# Patient Record
Sex: FEMALE
[De-identification: names, ages, dates, MRNs, and addresses within clinical notes are randomized per-mention and may not be internally consistent; named-entity substitution may affect disease eponyms.]

---

## 2017-12-05 ENCOUNTER — HOSPITAL ENCOUNTER (OUTPATIENT)
Dept: HOSPITAL 14 - H.OPSURG | Age: 67
Discharge: HOME | End: 2017-12-05
Attending: SPECIALIST
Payer: MEDICARE

## 2017-12-05 VITALS — OXYGEN SATURATION: 98 % | HEART RATE: 94 BPM | SYSTOLIC BLOOD PRESSURE: 150 MMHG | DIASTOLIC BLOOD PRESSURE: 80 MMHG

## 2017-12-05 VITALS — TEMPERATURE: 97.5 F

## 2017-12-05 VITALS — RESPIRATION RATE: 20 BRPM

## 2017-12-05 VITALS — BODY MASS INDEX: 26.7 KG/M2

## 2017-12-05 DIAGNOSIS — E78.5: ICD-10-CM

## 2017-12-05 DIAGNOSIS — I25.10: ICD-10-CM

## 2017-12-05 DIAGNOSIS — M65.811: ICD-10-CM

## 2017-12-05 DIAGNOSIS — M75.51: ICD-10-CM

## 2017-12-05 DIAGNOSIS — E11.9: ICD-10-CM

## 2017-12-05 DIAGNOSIS — M75.41: Primary | ICD-10-CM

## 2017-12-05 PROCEDURE — 82948 REAGENT STRIP/BLOOD GLUCOSE: CPT

## 2017-12-05 PROCEDURE — 93005 ELECTROCARDIOGRAM TRACING: CPT

## 2017-12-05 PROCEDURE — 29827 SHO ARTHRS SRG RT8TR CUF RPR: CPT

## 2017-12-05 PROCEDURE — 29826 SHO ARTHRS SRG DECOMPRESSION: CPT

## 2017-12-05 NOTE — PCM.ANESB1
Interscalene Block





- Brachial Plexus


Date of Procedure: 12/05/17


Anesthesiologist: Ryan


Pre-Procedure Diagnosis: Right rotator cuff tear


Post-Procedure Diagnosis: Same


Procedure Performed: Interscalene Block of Brachial Plexus Right





- Procedure


Interscalene Block of Brachial Plexus: 


This procedure was explained to the patient that it is for post-operative pain 

management. Consent was obtained after a thorough discussion with the patient 

regarding the benefits and possible complications of local anesthetic block of 

the Brachial Plexus at the Interscalene area. The patient was brought to the 

Operating Room and standard monitors were applied. Time out was held with the 

circulating nurse to confirm the correct surgery and appropriate block. After 

applying Oxygen by nasal cannula and administering IV Sedation, the patient's 

head was gently rotated away from the ___right___operative shoulder and the 

anterior scalene groove was carefully palpated. The ultrasound transducer was 

then applied to the skin in the transverse plane and the brachial plexus was 

visualized lateral to the carotid artery and in between the anterior and middle 

scalene muscles. After identification,the anterior lateral portion of the neck 

was prepped with Betadine solution three times and Lidocaine 1% was injected 

subcutaneously for topical analgesia.





At this point, a # 22 gauge Stimuplex 2 inches insulated needle was inserted 

into the interscalene groove and directed in a caudal and midline direction. 

The needle was inserted lateral to the ultrasound transducer in-plane towards 

the brachial plexus in a lateral-to-medial direction. Needle advancement was 

performed carefully under direct ultrasound visualization. Nerve stimulator was 

used and twitched of the affected extremity including the hand brachialis 

muscles, biceps and the deltoid was obtained at a current of __0.4___MA. After 

repeated negative aspiration,___20__cc of__.5%___,__marcaine________________

were injected. Under ultrasound guidance the local anesthetics were observed 

surrounding the roots of the brachial plexus. The needle was removed intact and 

sterile dressing was applied. The patient had stable vital signs, was conscious 

and in no apparent distress.





The patient tolerated the interscalene block of the bracheal plexus well with 

stable vital signs and was prepared for subsequent surgery.

## 2017-12-05 NOTE — PCM.SURG1
Surgeon's Initial Post Op Note





- Surgeon's Notes


Surgeon: Travis Ramos MD 


Assistant: Robyn White PA-C


Type of Anesthesia: General Endo, Block Regional


Pre-Operative Diagnosis: Right shoulder complete RTC tear


Operative Findings: see op report


Post-Operative Diagnosis: Same as pre-op dx


Operation Performed: Right shoulder arthroscopy, double row rotator cuff repair

, subacromial decompression, debriedement


Specimen/Specimens Removed: none


Estimated Blood Loss: EBL {In ML}: 5


Date of Surgery/Procedure: 12/05/17


Time of Surgery/Procedure: 09:00

## 2017-12-05 NOTE — OP
PROCEDURE DATE:  12/05/2017





PREOPERATIVE DIAGNOSES:

1.  Right shoulder full-thickness rotator cuff tear.

2.  Synovitis.

3.  Impingement.



POSTOPERATIVE DIAGNOSES:

1.  Right shoulder massive full-thickness supraspinatus tear.

2.  Extensive synovitis.

3.  Anterior capsular adhesions.

4.  Bursitis.

5.  Impingement.



PROCEDURES:

1.  Right shoulder arthroscopy, double row rotator cuff repair.

2.  Anterior capsular lysis of adhesions.

3.  Subacromial decompression with acromioplasty.

4.  Double row rotator cuff repair.



ANESTHESIA TYPE:  General with interscalene block.



ESTIMATED BLOOD LOSS:  10 mL.



SPECIMENS:  None.



COMPLICATIONS:  None.



ATTENDING SURGEON:  Travis Ramos MD



ASSISTANT:  Robyn White PA-C.



CLOSURE:  Primary.



FLUIDS:  See anesthesia sheet.



ANTIBIOTICS:  See anesthesia sheet.



INDICATIONS:  After failing a course of nonoperative therapy, the patient

elected to undergo the above procedures.  In the office the risks and

possible complications of the shoulder arthroscopy were discussed in detail

with the patient.  These risks include, but are not limited to, continued

pain, lack of motion, infection, vascular injury, and nerve injury

including axillary nerve dysfunction, reflex sympathetic dystrophy,

compartment syndrome, limb loss, and death.



The patient expressed an understanding of the risks and possible benefits

of the procedure, and was also made aware of the alternatives to surgery. 

An informed consent was obtained, and was checked immediately preop.



Procedure 1:  The patient was correctly identified in the holding area and

the right shoulder was marked with the surgeon's initials.  The patient was

transported to the operating room and placed in the supine position and

general anesthesia and regional interscalene block was used, exam under

anesthesia was obtained.  A preoperative orthopedic examination revealed a

passive range of motion of 160 degrees of forward elevation, 40 degrees of

external rotation, and 150 degrees of abduction.  Stability examination

revealed, no instability.



Procedure 2:  The patient was then placed in a beach chair position

utilizing the beach chair positioning device.  The patient's head was

stabilized and the indicated upper extremity was prepped and draped in the

standard surgical fashion.  The anatomic structures were outlined with a

skin marker, and 1% lidocaine with epinephrine was injected into the

posterior, anterior, and lateral portal areas.  A #21-gauge spinal needle

was placed in the glenohumeral joint from the posterior portal and 10 mL of

sterile saline was injected into the glenohumeral joint.  Return of fluid

indicated correct needle placement into the joint.  The needle was then

withdrawn and a #11 blade was used to make a 1-cm incision at the posterior

portal site.  Next, the arthroscopic blunt trocar was inserted into the

glenohumeral joint.  A #21-gauge spinal needle was placed through the

anterior rotator interval, and the anterior portal was made with a #11

blade after the spinal needle was withdrawn.  A 7-mm cannula was then

inserted after the skin incision was made and the arthroscopic probe was

then used to examine the internal structures of the glenohumeral joint. 

With the shoulder in abducted and externally rotated position, the

articular surface of the rotator cuff was visualized.  The arthroscope and

probe were then switched from posterior to anterior.  The posterior labrum,

posterior capsule, and biceps anchor reflection was then inspected with the

arthroscope in the anterior portal position.



Examination of the glenohumeral joint revealed:

1.  Synovitis.

2.  Full-thickness retracted supraspinatus and infraspinatus tear.

3.  Anterior capsular adhesions.



The radiofrequency device was introduced through the anterior portal and a

radiofrequency anterior capsular rotator interval lysis of adhesions was

performed.  The anterior capsule was released to optimize range of motion. 

The radiofrequency device was used to provide hemostasis during this

procedure.  After the lysis of adhesions, passive range of motion measured

170 degrees of forward elevation, 60 degrees of external rotation, and 150

degrees of abduction.



Excessive glenohumeral synovitis was cleared with a 4.0 mm full radius

shaver.  The hypertrophic, erythematous synovium was resected.  Hemostasis

was maintained with the radiofrequency device.



At this point, the arthroscope was withdrawn from the glenohumeral joint

and subacromial space was then entered using a blunt trocar.  Gentle

resistance sweeping against the coracoacromial ligament confirmed proper

placement of the sheath and the arthroscope was inserted.  A 1-cm incision

was made at the inferolateral acromial area to create the lateral portal.



Examination of the subacromial space revealed:

1.  Full thickness retracted supraspinatus tear.

2.  Bursitis.

3.  Impingement.



Visualization of the subacromial space was difficult due to excessive

bursitis.  A bursectomy was performed using a combination of radiofrequency

device as well as a 4.0-mm full radius motorized shaver.



The soft tissue on the undersurface of the acromion was debrided utilizing

the 4.0-mm full radius shaver and the radiofrequency device was used for

hemostasis.  At this point, the coracoacromial ligament was released with

the radiofrequency device and the acromial branch of the thoracoacromial

artery was coagulated with the same instrument.  Subacromial decompression

was performed with a 4.0-mm conical elizabeth using both the medial portal and

the "cutting-block" precision acromioplasty technique from the posterior

portal.  The undersurface of the acromion was resected to a flat, smooth

surface to allow unrestricted excursion of the rotator cuff.



After adequate subacromial decompression, attention was then turned to the

rotator cuff tear, which was easily visualized after adequate bursectomy

had been performed.  An auxiliary lateral portal was placed 2 cm posterior

to the original lateral portal, after a correct "dead-man's angle" was

determined using a transdeltoid 21 gauge spinal needle.  Arthroscopic soft

tissue releases were performed using an elevator at the coracohumeral

ligament insertion and superior glenoid to free up the rotator cuff to

provide adequate excursion to support a repair to the greater tuberosity. 

Next, the greater tuberosity was gently debrided with a combination of the

4.0 mm straight shaver and radiofrequency device, and the bone was denuded

to a bleeding surface using the 4.0-mm elizabeth.  The lateral margin of the

rotator cuff tear was debrided to a smooth and stable tendon surface using

the 4.0-mm shaver.  Two 4.5-mm Arthrex corkscrew suture anchors were placed

with the proper "dead-man's angle" into the greater tuberosity, and a

mattress suture from each anchor was passed through supraspinatus 10 mm

medial to the torn edge.  These anchors formed the medial row of the double

row repair.  The arm was abducted to 70 degrees, and the leading edge of

the cuff was drawn to its proper insertion on the greater tuberosity.  The

#2 FiberWire mattress sutures were tied with standard arthroscopic knot

tying techniques - Bakari knots and half hitches using a knot pusher.  The

remaining sutures were secured to the tuberosity with two 4.5-mm PushLock

absorbable anchors, which were placed with standard technique into the

lateral aspect of the greater tuberosity approximately 1 cm lateral to the

medial row anchors.  One suture strand from each knot was crossed to the

diagonal PushLock anchor along with the suture strand from the

corresponding anchor directly medial.  This linking of the medial and

lateral row formed a "suture bridge" rotator cuff repair.  The ends of the

remaining sutures were then cut.  The shoulder was put through a passive

ROM, and the rotator cuff repair was noted to be stable through a ROM of

130/50.  No prominence of the suture knots or of rotator cuff tissue was

noted to impinge during abduction and internal rotation.



The subacromial space was then irrigated with sterile saline, and closure

was instituted with sutures.  A dressing was placed consisting of Xeroform,

4x4's, ABD pads, and tape.  The patient was placed in a sling with an ABD

pad in the axilla.



The patient was then placed in a supine position and extubated without

incident.  The patient was transferred to the recovery room in stable

condition, having tolerated the procedure well.



Postoperatively, the patient will be maintained in an abduction sling, also

provided with my rehab protocol, defining the restriction and sling use for

6 weeks.



During this procedure, I was assisted by Robyn White PA-C, who assisted

in positioning the patient on the operating room table as well as

transferring the patient from the operating room table to the recovery room

stretcher.  In addition, Robyn White PA-C, assisted me during the

actual operative procedure by positioning the patient's extremity to allow

for easier arthroscopic access to all areas of the joint.  The presence of

Robyn White PA-C, as my operative assistant, was medically necessary to

ensure the utmost safety of the patient in the pre, intra-, and

postoperative periods.





__________________________________________

Travis Ramos MD



DD:  12/05/2017 14:29:14

DT:  12/05/2017 15:22:54

Job # [id number]

## 2018-02-04 ENCOUNTER — HOSPITAL ENCOUNTER (EMERGENCY)
Dept: HOSPITAL 31 - C.ER | Age: 68
Discharge: HOME | End: 2018-02-04
Payer: MEDICARE

## 2018-02-04 VITALS — BODY MASS INDEX: 26.4 KG/M2

## 2018-02-04 VITALS
TEMPERATURE: 99.8 F | RESPIRATION RATE: 20 BRPM | SYSTOLIC BLOOD PRESSURE: 114 MMHG | DIASTOLIC BLOOD PRESSURE: 70 MMHG | HEART RATE: 99 BPM

## 2018-02-04 VITALS — OXYGEN SATURATION: 99 %

## 2018-02-04 DIAGNOSIS — M54.2: Primary | ICD-10-CM

## 2018-02-04 PROCEDURE — 99285 EMERGENCY DEPT VISIT HI MDM: CPT

## 2018-02-04 PROCEDURE — 96374 THER/PROPH/DIAG INJ IV PUSH: CPT

## 2018-02-04 NOTE — C.PDOC
History Of Present Illness


67 year old female with PMHx of posterior neck pain with bulging disks presents 

to the ED c/o posterior neck pain. Patient reports she has bouchra receiving 

[place] [facility] [place] states she was putting heating 

pads and she states it makes it worse. Patient denies weakness, numbness, LOC, 

headache, visual changes. 


Time Seen by Provider: 02/04/18 14:09


Chief Complaint (Nursing): Dizziness/Lightheaded


History Per: Patient


History/Exam Limitations: no limitations


Onset/Duration Of Symptoms: Days


Current Symptoms Are (Timing): Gone


Associated Symptoms Preceding Syncopal Episode: No Predromal Symptoms (Sudden 

Onset)


Seizure Or Post-ictal Symptoms: None


Fall Associated With With Symptoms: No


Recent travel outside of the [place]: No


Additional History Per: Patient





Past Medical History


Reviewed: Historical Data, Nursing Documentation, Vital Signs


Vital Signs: 


 Last Vital Signs











Temp  99.8 F H  02/04/18 16:05


 


Pulse  99 H  02/04/18 16:05


 


Resp  20   02/04/18 16:05


 


BP  114/70   02/04/18 16:05


 


Pulse Ox  98   02/04/18 16:05














- Medical History


PMH: Arthritis, Diabetes, Fibromyalgia, Gastritis, HTN, Hypercholesterolemia, 

Migraine, TIA


   Denies: Fractures, Chronic Kidney Disease


Surgical History: Cholecystectomy, Endoscopy


Family History: States: No Known Family Hx





- Social History


Hx Tobacco Use: No


Hx Alcohol Use: No


Hx Substance Use: No





- Immunization History


Hx Tetanus Toxoid Vaccination: Yes


Hx Influenza Vaccination: Yes


Hx Pneumococcal Vaccination: Yes





Review Of Systems


Constitutional: Negative for: Fever, Chills


Eyes: Negative for: Vision Change


Cardiovascular: Negative for: Chest Pain, Palpitations


Respiratory: Negative for: Cough, Shortness of Breath


Gastrointestinal: Negative for: Nausea, Vomiting, Abdominal Pain


Musculoskeletal: Positive for: Neck Pain


Skin: Negative for: Rash


Neurological: Negative for: Weakness, Numbness, Headache





Physical Exam





- Physical Exam


Appears: Non-toxic, No Acute Distress


Skin: Normal Color, Warm, Dry, No Rash


Head: Atraumatic, Normacephalic


Eye(s): bilateral: Normal Inspection


Nose: No Discharge, No Deformity


Oral Mucosa: Moist


Neck: Normal ROM, Supple, Other (posterior neck tenderness, no rash)


Chest: Symmetrical


Cardiovascular: Rhythm Regular, No Murmur


Respiratory: Normal Breath Sounds, No Rales, No Rhonchi, No Wheezing


Gastrointestinal/Abdominal: Soft, No Tenderness, No Guarding, No Rebound


Extremity: Normal ROM, No Calf Tenderness, No Deformity, No Swelling


Neurological/Psych: Oriented x3, Normal Speech, Normal Cognition


Gait: Steady





ED Course And Treatment


O2 Sat by Pulse Oximetry: 99 (On RA)


Pulse Ox Interpretation: Normal





Medical Decision Making


Medical Decision Making: 


Plan:


* Toradol 60 mg IM


* Toradol 30 mg IVP


* Ultram 50 mg PO








3-4 days worstening chronic cervical pain, w h/o disk bult C4/5/6 and h/o 

epidural injections with relief 5/17 by Dr. Guy [place] [facility] [place] worse 

with heat therapies and no NSAID therapies.





d/w Dr. Colon- PMD, suggests to defer w/u for known condition, stop heat 

therapies and start NSAIDS and f/u w her (PMD) and [place] Mal[place] ordo[place] [facility] [place]





Disposition


Doctor Will See Patient In The: Office


Counseled Patient/Family Regarding: Studies Performed, Diagnosis





- Disposition


Referrals: 


Gerald Guy MD [Staff Provider] - 


Niki Rizo MD [Staff Provider] - 


Grace Guy MD [Staff Provider] - 


Disposition: HOME/ ROUTINE


Disposition Time: 15:01


Condition: GOOD


Additional Instructions: 


continue ice packs 1/2 hour per hour to posterior neck area- nothing hot!





Toradol 10 mg three times a day as needed (anti-inflammatory)





Tramadol 50 mg 3-4x/day as needed for pain (moderate narcotic)





Percocet/Oxycodone: AFTER using ice and NSAIDS and narcotics, THEN use as 

needed 1-2 x/day- usually at night for sleep or while resting at home and NOT 

when leaving the house (falls risks)





[place] [facility] [place] for [place] and Dr. Rizo 

(call for appt)





Prescriptions: 


Ketorolac Tromethamine [Toradol] 10 mg PO Q8H PRN #20 tab


 PRN Reason: pain 


Instructions:  Cervical Sprain (ED)


Forms:  [facility] (English)





- Clinical Impression


Clinical Impression: 


 Cervicalgia








- Scribe Statement


The provider has reviewed the documentation as recorded by the Scribe





Derrick Luz





All medical record entries made by the Scribe were at my direction and 

personally dictated by me. I have reviewed the chart and agree that the record 

accurately reflects my personal performance of the history, physical exam, 

medical decision making, and the department course for this patient. I have 

also personally directed, reviewed, and agree with the discharge instructions 

and disposition.